# Patient Record
Sex: FEMALE | Race: WHITE | NOT HISPANIC OR LATINO | Employment: FULL TIME | URBAN - METROPOLITAN AREA
[De-identification: names, ages, dates, MRNs, and addresses within clinical notes are randomized per-mention and may not be internally consistent; named-entity substitution may affect disease eponyms.]

---

## 2024-02-12 ENCOUNTER — HOSPITAL ENCOUNTER (EMERGENCY)
Facility: HOSPITAL | Age: 24
Discharge: HOME | End: 2024-02-12
Attending: STUDENT IN AN ORGANIZED HEALTH CARE EDUCATION/TRAINING PROGRAM
Payer: COMMERCIAL

## 2024-02-12 VITALS
TEMPERATURE: 99 F | HEART RATE: 83 BPM | SYSTOLIC BLOOD PRESSURE: 134 MMHG | HEIGHT: 64 IN | DIASTOLIC BLOOD PRESSURE: 92 MMHG | WEIGHT: 237 LBS | OXYGEN SATURATION: 97 % | RESPIRATION RATE: 16 BRPM | BODY MASS INDEX: 40.46 KG/M2

## 2024-02-12 DIAGNOSIS — L03.113 CELLULITIS OF RIGHT ELBOW: Primary | ICD-10-CM

## 2024-02-12 DIAGNOSIS — M25.521 ELBOW PAIN, RIGHT: ICD-10-CM

## 2024-02-12 PROCEDURE — 63600175 PHARM REV CODE 636 W HCPCS

## 2024-02-12 PROCEDURE — 96372 THER/PROPH/DIAG INJ SC/IM: CPT

## 2024-02-12 PROCEDURE — 25000003 PHARM REV CODE 250

## 2024-02-12 PROCEDURE — 99284 EMERGENCY DEPT VISIT MOD MDM: CPT | Mod: 25

## 2024-02-12 RX ORDER — TRAMADOL HYDROCHLORIDE AND ACETAMINOPHEN 37.5; 325 MG/1; MG/1
1 TABLET, FILM COATED ORAL 3 TIMES DAILY PRN
Qty: 15 TABLET | Refills: 0 | Status: SHIPPED | OUTPATIENT
Start: 2024-02-12

## 2024-02-12 RX ORDER — SULFAMETHOXAZOLE AND TRIMETHOPRIM 800; 160 MG/1; MG/1
1 TABLET ORAL
Status: COMPLETED | OUTPATIENT
Start: 2024-02-12 | End: 2024-02-12

## 2024-02-12 RX ORDER — SULFAMETHOXAZOLE AND TRIMETHOPRIM 800; 160 MG/1; MG/1
1 TABLET ORAL 2 TIMES DAILY
Qty: 14 TABLET | Refills: 0 | Status: SHIPPED | OUTPATIENT
Start: 2024-02-12 | End: 2024-02-19

## 2024-02-12 RX ORDER — KETOROLAC TROMETHAMINE 30 MG/ML
30 INJECTION, SOLUTION INTRAMUSCULAR; INTRAVENOUS
Status: COMPLETED | OUTPATIENT
Start: 2024-02-12 | End: 2024-02-12

## 2024-02-12 RX ADMIN — SULFAMETHOXAZOLE AND TRIMETHOPRIM 1 TABLET: 800; 160 TABLET ORAL at 08:02

## 2024-02-12 RX ADMIN — KETOROLAC TROMETHAMINE 30 MG: 30 INJECTION, SOLUTION INTRAMUSCULAR; INTRAVENOUS at 08:02

## 2024-02-12 NOTE — Clinical Note
"Mechelle Terrell" Alivia was seen and treated in our emergency department on 2/12/2024.  She may return to work on 02/15/2024.       If you have any questions or concerns, please don't hesitate to call.      Channing Hernandez RN    "

## 2024-02-13 NOTE — ED PROVIDER NOTES
Encounter Date: 2/12/2024       History     Chief Complaint   Patient presents with    Arm Injury     PT TO ER WITH C/O RIGHT ELBOW PAIN X 10 DAYS.      This note is dictated on M*Modal word recognition program.  There are word recognition mistakes and grammatical errors that are occasionally missed on review.     Mechelle Bunch is a 23 y.o. female presents to ER today with complaints of 2 weeks of elbow pain.  Patient reports over the last 48 hours the elbow pain has become worse and she has a associated skin redness with warmth that has been spreading from the center of the elbow.  Patient reports approximately a week ago she noticed a small scrape/pimple to her right elbow that she did not pay any mind to however her elbow has been aching her since then.  Patient rates pain right now 7/10.  Patient reports ibuprofen is not really helping with the pain at home.  Patient reports she was using Ace wrap to right elbow that has not really helping with the pain either.  Patient denies any fevers.  Patient is able to flex and extend elbow without difficulty.  There is some pain.    The history is provided by the patient.     Review of patient's allergies indicates:  No Known Allergies  History reviewed. No pertinent past medical history.  No past surgical history on file.  No family history on file.     Review of Systems   Constitutional: Negative.    HENT: Negative.     Eyes: Negative.    Respiratory: Negative.     Cardiovascular: Negative.    Gastrointestinal: Negative.    Endocrine: Negative.    Genitourinary: Negative.    Musculoskeletal:  Positive for arthralgias and joint swelling.   Skin:  Positive for color change.   Allergic/Immunologic: Negative.    Neurological: Negative.    Hematological: Negative.    Psychiatric/Behavioral: Negative.         Physical Exam     Initial Vitals [02/12/24 1920]   BP Pulse Resp Temp SpO2   (!) 134/92 83 16 98.5 °F (36.9 °C) 97 %      MAP       --         Physical  Exam    Constitutional: She appears well-developed and well-nourished. She is not diaphoretic.   HENT:   Right Ear: External ear normal.   Eyes: Pupils are equal, round, and reactive to light. Right eye exhibits no discharge. Left eye exhibits no discharge.   Neck: Neck supple.   Normal range of motion.  Cardiovascular:  Normal rate.     Exam reveals no friction rub.       No murmur heard.  Pulmonary/Chest: Breath sounds normal. No respiratory distress. She has no wheezes. She has no rhonchi. She has no rales. She exhibits no tenderness.   Abdominal: Abdomen is soft. Bowel sounds are normal.   Musculoskeletal:         General: No tenderness or edema.      Cervical back: Normal range of motion and neck supple.     Neurological: She is alert and oriented to person, place, and time.   Skin: Capillary refill takes less than 2 seconds. No abscess noted. There is erythema.   Patient has erythema to right elbow with some mild streaking to forearm.  There is obvious warmth.  Findings are consistent with cellulitis.   Psychiatric: She has a normal mood and affect. Her behavior is normal. Thought content normal.         ED Course   Procedures  Labs Reviewed - No data to display       Imaging Results              X-Ray Elbow Complete Right (Final result)  Result time 02/12/24 19:36:08      Final result by Robbie Mckinley MD (02/12/24 19:36:08)                   Impression:      No radiographic evidence of acute displaced fracture or dislocation of the right elbow.  Nonspecific subcutaneous edema involving the dorsal/posterior soft tissues.      Electronically signed by: Robbie Mckinley MD  Date:    02/12/2024  Time:    19:36               Narrative:    EXAMINATION:  XR ELBOW COMPLETE 3 VIEW RIGHT    CLINICAL HISTORY:  . Pain in right elbow    TECHNIQUE:  AP, lateral, and oblique views of the right elbow were performed.    COMPARISON:  None    FINDINGS:  There is no radiographic evidence of acute displaced fracture of the  "right elbow.  Alignment appears within normal limits without evidence of dislocation.  No significant joint effusion appreciated radiographically.  Mild nonspecific soft tissue edema noted along the posterior/dorsal soft tissues of the visualized upper extremity.                                       Medications   ketorolac injection 30 mg (has no administration in time range)   sulfamethoxazole-trimethoprim 800-160mg per tablet 1 tablet (has no administration in time range)     Medical Decision Making  Differential diagnosis include tennis elbow, golfer's elbow, cellulitis, bursitis, septic arthritis, gout    X-ray of right elbow has the following impression--    No radiographic evidence of acute displaced fracture or dislocation of the right elbow.  Nonspecific subcutaneous edema involving the dorsal/posterior soft tissues.  Physical exam findings are consistent with cellulitis of right elbow.  Will place patient on Bactrim, Ultracet for pain control.  Patient will follow-up in 48 hours with her PCP.  I advised patient had Bactrim could be dangerous if she was pregnant.  When asked if she was pregnant patient reports " absolutely no chance."  Vital signs hemodynamically stable.  Patient afebrile.  Patient is able to flex and extend elbow without difficulty.  Right upper extremity neurovascularly intact at this time.  Patient stable at time of discharge in no acute distress.  No life-threatening illnesses were found during ER visit today.  Patient was instructed to follow-up with PCP or other recommended specialist within the next 48-72 hours.  Patient was instructed to return to ER immediately for any worsening or concerning symptoms.  All discharge instructions discussed with patient, and patient agrees to comply with discharge instructions given today.     Amount and/or Complexity of Data Reviewed  Radiology: ordered.                                      Clinical Impression:  Final diagnoses:  [M25.521] Elbow " pain, right  [L03.113] Cellulitis of right elbow (Primary)          ED Disposition Condition    Discharge Stable          ED Prescriptions       Medication Sig Dispense Start Date End Date Auth. Provider    sulfamethoxazole-trimethoprim 800-160mg (BACTRIM DS) 800-160 mg Tab Take 1 tablet by mouth 2 (two) times daily. for 7 days 14 tablet 2/12/2024 2/19/2024 Nestor Walker, NP    tramadol-acetaminophen 37.5-325 mg (ULTRACET) 37.5-325 mg Tab Take 1 tablet by mouth 3 (three) times daily as needed for Pain (dx code M25.521). dx code M25.521 15 tablet 2/12/2024 -- Nestor Walker, NAUN          Follow-up Information    None          Nestor Walker, NAUN  02/12/24 2009